# Patient Record
Sex: FEMALE | Race: WHITE | NOT HISPANIC OR LATINO | Employment: UNEMPLOYED | URBAN - METROPOLITAN AREA
[De-identification: names, ages, dates, MRNs, and addresses within clinical notes are randomized per-mention and may not be internally consistent; named-entity substitution may affect disease eponyms.]

---

## 2017-09-25 ENCOUNTER — OFFICE VISIT (OUTPATIENT)
Dept: MEDICAL GROUP | Facility: MEDICAL CENTER | Age: 52
End: 2017-09-25
Payer: MEDICARE

## 2017-09-25 VITALS
TEMPERATURE: 97.5 F | DIASTOLIC BLOOD PRESSURE: 70 MMHG | SYSTOLIC BLOOD PRESSURE: 98 MMHG | BODY MASS INDEX: 35.51 KG/M2 | OXYGEN SATURATION: 95 % | HEIGHT: 64 IN | WEIGHT: 208 LBS | HEART RATE: 85 BPM

## 2017-09-25 DIAGNOSIS — Z12.31 ENCOUNTER FOR SCREENING MAMMOGRAM FOR MALIGNANT NEOPLASM OF BREAST: ICD-10-CM

## 2017-09-25 DIAGNOSIS — Z79.899 HIGH RISK MEDICATION USE: ICD-10-CM

## 2017-09-25 DIAGNOSIS — E66.9 OBESITY (BMI 30-39.9): ICD-10-CM

## 2017-09-25 DIAGNOSIS — E78.5 HYPERLIPIDEMIA LDL GOAL <100: ICD-10-CM

## 2017-09-25 DIAGNOSIS — K59.09 CHRONIC CONSTIPATION: ICD-10-CM

## 2017-09-25 DIAGNOSIS — H02.843 EYELID GLAND SWELLING, RIGHT: ICD-10-CM

## 2017-09-25 DIAGNOSIS — E03.9 ACQUIRED HYPOTHYROIDISM: ICD-10-CM

## 2017-09-25 DIAGNOSIS — E55.9 VITAMIN D DEFICIENCY: ICD-10-CM

## 2017-09-25 DIAGNOSIS — Z12.11 SCREENING FOR MALIGNANT NEOPLASM OF COLON: ICD-10-CM

## 2017-09-25 PROCEDURE — 99214 OFFICE O/P EST MOD 30 MIN: CPT | Performed by: NURSE PRACTITIONER

## 2017-09-25 RX ORDER — POLYETHYLENE GLYCOL 3350 17 G/17G
17 POWDER, FOR SOLUTION ORAL
Qty: 90 EACH | Refills: 1 | Status: SHIPPED | OUTPATIENT
Start: 2017-09-25 | End: 2017-11-28

## 2017-09-25 RX ORDER — SULFAMETHOXAZOLE AND TRIMETHOPRIM 800; 160 MG/1; MG/1
1 TABLET ORAL 2 TIMES DAILY
Qty: 14 TAB | Refills: 0 | Status: SHIPPED | OUTPATIENT
Start: 2017-09-25 | End: 2017-09-25 | Stop reason: SDUPTHER

## 2017-09-25 RX ORDER — SENNA AND DOCUSATE SODIUM 50; 8.6 MG/1; MG/1
1 TABLET, FILM COATED ORAL 2 TIMES DAILY
Qty: 180 TAB | Refills: 1 | Status: SHIPPED | OUTPATIENT
Start: 2017-09-25 | End: 2017-09-25 | Stop reason: SDUPTHER

## 2017-09-25 RX ORDER — SULFAMETHOXAZOLE AND TRIMETHOPRIM 800; 160 MG/1; MG/1
1 TABLET ORAL 2 TIMES DAILY
Qty: 14 TAB | Refills: 0 | Status: SHIPPED | OUTPATIENT
Start: 2017-09-25 | End: 2017-11-28

## 2017-09-25 RX ORDER — POLYETHYLENE GLYCOL 3350 17 G/17G
17 POWDER, FOR SOLUTION ORAL
Qty: 90 EACH | Refills: 1 | Status: SHIPPED | OUTPATIENT
Start: 2017-09-25 | End: 2017-09-25 | Stop reason: SDUPTHER

## 2017-09-25 RX ORDER — ASCORBIC ACID 500 MG
500 TABLET ORAL DAILY
Qty: 30 TAB | Refills: 0
Start: 2017-09-25

## 2017-09-25 RX ORDER — SENNA AND DOCUSATE SODIUM 50; 8.6 MG/1; MG/1
1 TABLET, FILM COATED ORAL 2 TIMES DAILY
Qty: 180 TAB | Refills: 1 | Status: SHIPPED | OUTPATIENT
Start: 2017-09-25

## 2017-09-25 ASSESSMENT — PATIENT HEALTH QUESTIONNAIRE - PHQ9: CLINICAL INTERPRETATION OF PHQ2 SCORE: 0

## 2017-09-25 NOTE — PROGRESS NOTES
Subjective:     Ginette Vasquez is a 52 y.o. female who presents with No chief complaint on file.  .    HPI:   Seen in f/u for rt periorbital eye swelling.  Mother noted the swelling 1 week ago.  Eye is not red.  No pain.  No dg.  She stopped wearing makeup abotu 4 days ago.  No change in swelling.  No vision change.    She is due lab for her HRA next month.  She is due a flu shot.   She is due mammo, colonoscopy, routine lab.    No allergy sx.  No fever, chills or sweating.  + nasal secretion that is clear.  No headache, sinus pressure or ear pressure.   + constipation.  Using 3 colace daily.  Not helping.  She is on paleo diet.  She has lost 10 lbs.        Patient Active Problem List    Diagnosis Date Noted   • Obesity (BMI 30-39.9) 09/25/2017   • Vitamin D deficiency    • Hypothyroidism 10/12/2011   • Bipolar disease, chronic (CMS-[facility]) 10/12/2011   • Preventative health care 10/12/2011       Current medicines (including changes today)  Current Outpatient Prescriptions   Medication Sig Dispense Refill   • ascorbic acid (VITAMIN C) 500 MG tablet Take 1 Tab by mouth every day. 30 Tab 0   • sennosides-docusate sodium (SENOKOT-S) 8.6-50 MG tablet Take 1 Tab by mouth 2 times a day. 180 Tab 1   • psyllium (METAMUCIL SMOOTH TEXTURE) 28 % packet Take 1 Packet by mouth 2 times a day. 180 Each 1   • sulfamethoxazole-trimethoprim (BACTRIM DS) 800-160 MG tablet Take 1 Tab by mouth 2 times a day. 14 Tab 0   • polyethylene glycol/lytes (MIRALAX) Pack Take 1 Packet by mouth every 24 hours as needed. 90 Each 1   • paliperidone (INVEGA) 3 MG ER tablet Take 1 Tab by mouth every morning. 30 Tab 0   • quetiapine (SEROQUEL) 400 MG tablet Take 400 mg by mouth 2 times a day.     • Cholecalciferol (VITAMIN D) 2000 UNITS CAPS Take 2,000 Units by mouth every day.     • lithium (ESKALITH) 300 MG TABS Take 600 mg by mouth every bedtime.     • levothyroxine (LEVOXYL) 100 MCG TABS Take 100 mcg by mouth every day.       No current  "facility-administered medications for this visit.        Allergies   Allergen Reactions   • Nkda [No Known Drug Allergy]        ROS  Constitutional: Negative. Negative for fever, chills, weight loss, malaise/fatigue and diaphoresis.   HENT: Negative. Negative for hearing loss, ear pain, nosebleeds, congestion, sore throat, neck pain, tinnitus and ear discharge.   Respiratory: Negative. Negative for cough, hemoptysis, sputum production, shortness of breath, wheezing and stridor.   Cardiovascular: Negative. Negative for chest pain, palpitations, orthopnea, claudication, leg swelling and PND.   Gastrointestinal: Denies nausea, vomiting, diarrhea, heartburn, melena or hematochezia.  Genitourinary: Denies dysuria, hematuria, urinary incontinence, frequency or urgency.        Objective:     Blood pressure (!) 98/70, pulse 85, temperature 36.4 °C (97.5 °F), height 1.626 m (5' 4\"), weight 94.3 kg (208 lb), SpO2 95 %, not currently breastfeeding. Body mass index is 35.7 kg/m².    Physical Exam:  Vitals reviewed.  Constitutional: Oriented to person, place, and time. appears well-developed and well-nourished. No distress.   Cardiovascular: Normal rate, regular rhythm, normal heart sounds and intact distal pulses. Exam reveals no gallop and no friction rub. No murmur heard. No carotid bruits.   Rt upper eye lid swollen with mild erythema.  No tenderenss.  No dg.  PERRLA.  No nystagmus.  No dg.    Pulmonary/Chest: Effort normal and breath sounds normal. No stridor. No respiratory distress. no wheezes or rales. exhibits no tenderness.   Musculoskeletal: Normal range of motion. exhibits no edema. val pedal pulses 2+.  Lymphadenopathy: No cervical or supraclavicular adenopathy.   Neurological: Alert and oriented to person, place, and time. exhibits normal muscle tone.  Skin: Skin is warm and dry. No diaphoresis.   Psychiatric: Normal mood and affect. Behavior is normal.      Assessment and Plan:     The following treatment plan was " discussed:    1. Eyelid gland swelling, right  REFERRAL TO OPHTHALMOLOGY    sulfamethoxazole-trimethoprim (BACTRIM DS) 800-160 MG tablet    DISCONTINUED: sulfamethoxazole-trimethoprim (BACTRIM DS) 800-160 MG tablet    poss infection.  tx with bactrim and refer ophth.     2. Obesity (BMI 30-39.9)  Patient identified as having weight management issue.  Appropriate orders and counseling given.   3. Acquired hypothyroidism  TSH    FREE THYROXINE   4. High risk medication use  CBC WITH DIFFERENTIAL   5. Vitamin D deficiency  ascorbic acid (VITAMIN C) 500 MG tablet    VITAMIN D,25 HYDROXY   6. Hyperlipidemia LDL goal <100  COMP METABOLIC PANEL    LIPID PROFILE   7. Encounter for screening mammogram for malignant neoplasm of breast  MA-SCREEN MAMMO W/CAD-BILAT   8. Screening for malignant neoplasm of colon  REFERRAL TO GASTROENTEROLOGY   9. Chronic constipation  sennosides-docusate sodium (SENOKOT-S) 8.6-50 MG tablet    psyllium (METAMUCIL SMOOTH TEXTURE) 28 % packet    polyethylene glycol/lytes (MIRALAX) Pack    DISCONTINUED: psyllium (METAMUCIL SMOOTH TEXTURE) 28 % packet    DISCONTINUED: sennosides-docusate sodium (SENOKOT-S) 8.6-50 MG tablet    DISCONTINUED: polyethylene glycol/lytes (MIRALAX) Pack         Followup: Return in about 1 week (around 10/2/2017) for Long, Short.

## 2017-11-15 ENCOUNTER — TELEPHONE (OUTPATIENT)
Dept: MEDICAL GROUP | Facility: MEDICAL CENTER | Age: 52
End: 2017-11-15

## 2017-11-16 NOTE — TELEPHONE ENCOUNTER
ANNUAL WELLNESS VISIT PRE-VISIT PLANNING     1.  Reviewed note from last office visit with PCP: YES    2.  If any orders were placed at last visit, do we have Results/Consult Notes?        •  Labs - Labs ordered, NOT completed. Patient advised to complete prior to next appointment.   Note: If patient appointment is for lab review and patient did not complete labs, check with provider if OK to reschedule patient until labs completed.       •  Imaging - Mammogram- appt will be rescheduled        •  Referrals - colonoscopy    3.  Immunizations were updated in [facility] using WebIZ?: Yes       •  WebIZ Recommendations: TDAP, ZOSTAVAX (Shingles) and MMR         •  Is patient due for Tdap? YES. Patient was notified of copay/out of pocket cost.       •  Is patient due for Shingles? NO     4.  Patient is due for the following Health Maintenance Topics:   Health Maintenance Due   Topic Date Due   • IMM DTaP/Tdap/Td Vaccine (1 - Tdap) 06/29/1985   • IMM PNEUMOCOCCAL 19-64 (ADULT) MEDIUM RISK SERIES (1 of 1 - PPSV23) 06/29/1985   • Annual Wellness Visit  07/23/2015   • PAP SMEAR  01/29/2016   • COLONOSCOPY  06/29/2016 pt will schedule in 1/2018   • MAMMOGRAM  05/17/2017           5.  Reviewed/Updated the following with patient:       •   Preferred Pharmacy? YES       •   Preferred Lab? YES       •   Preferred Communication? YES       •   Allergies? YES       •   Medications? YES. Was Abstract Encounter opened and chart updated? YES       •   Social History? YES. Was Abstract Encounter opened and chart updated? YES       •   Family History (document living status of immediate family members and if + hx of cancer, diabetes, hypertension, hyperlipidemia, heart attack, stroke) YES. Was Abstract Encounter opened and chart updated? YES    6.  Care Team Updated:       •   DME Company (gait device, O2, CPAP, etc.): YES       •   Other Specialists (eye doctor, derm, GYN, cardiology, endo, etc): YES    7.  Patient has the following Care Path  diagnoses on Problem List:  NONE    8.  Specialty Comments was updated with diagnosis information provided by SCP: YES    9.  Patient was advised: “This is a free wellness visit. The provider will screen for medical conditions to help you stay healthy. If you have other concerns to address you may be asked to discuss these at a separate visit or there may be an additional fee.”     6.  Patient was informed to arrive 15 min prior to their scheduled appointment and bring in their medication bottles.

## 2017-11-28 ENCOUNTER — OFFICE VISIT (OUTPATIENT)
Dept: MEDICAL GROUP | Facility: MEDICAL CENTER | Age: 52
End: 2017-11-28
Payer: MEDICARE

## 2017-11-28 VITALS
DIASTOLIC BLOOD PRESSURE: 82 MMHG | BODY MASS INDEX: 35.17 KG/M2 | SYSTOLIC BLOOD PRESSURE: 122 MMHG | OXYGEN SATURATION: 95 % | TEMPERATURE: 97.1 F | HEART RATE: 82 BPM | WEIGHT: 206 LBS | RESPIRATION RATE: 14 BRPM | HEIGHT: 64 IN

## 2017-11-28 DIAGNOSIS — Z12.31 ENCOUNTER FOR SCREENING MAMMOGRAM FOR MALIGNANT NEOPLASM OF BREAST: ICD-10-CM

## 2017-11-28 DIAGNOSIS — E66.9 OBESITY (BMI 30-39.9): ICD-10-CM

## 2017-11-28 DIAGNOSIS — F31.9 BIPOLAR DISEASE, CHRONIC (HCC): Primary | ICD-10-CM

## 2017-11-28 DIAGNOSIS — E55.9 VITAMIN D DEFICIENCY: ICD-10-CM

## 2017-11-28 DIAGNOSIS — Z12.11 SCREEN FOR COLON CANCER: ICD-10-CM

## 2017-11-28 DIAGNOSIS — E03.9 ACQUIRED HYPOTHYROIDISM: ICD-10-CM

## 2017-11-28 DIAGNOSIS — Z23 NEED FOR PNEUMOCOCCAL VACCINATION: ICD-10-CM

## 2017-11-28 PROCEDURE — G0439 PPPS, SUBSEQ VISIT: HCPCS | Mod: 25 | Performed by: NURSE PRACTITIONER

## 2017-11-28 PROCEDURE — 90670 PCV13 VACCINE IM: CPT | Performed by: NURSE PRACTITIONER

## 2017-11-28 PROCEDURE — G0009 ADMIN PNEUMOCOCCAL VACCINE: HCPCS | Performed by: NURSE PRACTITIONER

## 2017-11-28 ASSESSMENT — PATIENT HEALTH QUESTIONNAIRE - PHQ9: CLINICAL INTERPRETATION OF PHQ2 SCORE: 0

## 2017-11-28 NOTE — PROGRESS NOTES
Chief Complaint   Patient presents with   • Annual Wellness Visit         HPI:  Ginette is a 52 y.o. here for Medicare Annual Wellness Visit    She is feeling well.    She is overdue for pap smear,  Mammo, colonoscopy, prevnar 13 and lab.  Will do and f/u for review.        Patient Active Problem List    Diagnosis Date Noted   • Obesity (BMI 30-39.9) 09/25/2017   • Vitamin D deficiency    • Hypothyroidism 10/12/2011   • Bipolar disease, chronic (CMS-HCC) 10/12/2011   • Preventative health care 10/12/2011       Current Outpatient Prescriptions   Medication Sig Dispense Refill   • ascorbic acid (VITAMIN C) 500 MG tablet Take 1 Tab by mouth every day. 30 Tab 0   • paliperidone (INVEGA) 3 MG ER tablet Take 1 Tab by mouth every morning. 30 Tab 0   • quetiapine (SEROQUEL) 400 MG tablet Take 400 mg by mouth 2 times a day.     • Cholecalciferol (VITAMIN D) 2000 UNITS CAPS Take 2,000 Units by mouth every day.     • lithium (ESKALITH) 300 MG TABS Take 600 mg by mouth every bedtime.     • levothyroxine (LEVOXYL) 100 MCG TABS Take 100 mcg by mouth every day.     • sennosides-docusate sodium (SENOKOT-S) 8.6-50 MG tablet Take 1 Tab by mouth 2 times a day. (Patient not taking: Reported on 11/28/2017) 180 Tab 1     No current facility-administered medications for this visit.         Patient is taking medications as noted in medication list.  Current supplements as per medication list.     Allergies: Nkda [no known drug allergy]    Current social contact/activities: going shopping with sister and mom     Is patient current with immunizations? No, due for PNEUMOVAX (PPSV23). Patient is interested in receiving PNEUMOVAX (PPSV23) today.    She  reports that she has been smoking Cigarettes.  She has a 10.00 pack-year smoking history. She has never used smokeless tobacco. She reports that she does not drink alcohol or use drugs.  Ready to quit: No  Counseling given: Yes        DPA/Advanced directive: Patient does not have an Advanced  Directive.  A packet and workshop information was given on Advanced Directives.    ROS:    Gait: Uses no assistive device   Ostomy: no   Other tubes: no   Amputations: no   Chronic oxygen use no   Last eye exam 10/2017   Wears hearing aids: no   : Denies any urinary leakage during the last 6 months  Review of Systems   Constitutional: Negative.  Negative for fever, chills, weight loss, malaise/fatigue and diaphoresis.   HENT: Negative.  Negative for hearing loss, ear pain, nosebleeds, congestion, sore throat, neck pain, tinnitus and ear discharge.    Eyes: Negative.  Negative for blurred vision, double vision, photophobia, pain, discharge and redness.   Respiratory: Negative.  Negative for cough, hemoptysis, sputum production, shortness of breath, wheezing and stridor.    Cardiovascular: Negative.  Negative for chest pain, palpitations, orthopnea, claudication, leg swelling and PND.   Gastrointestinal: Negative.  Negative for heartburn, nausea, vomiting, abdominal pain, diarrhea, constipation, blood in stool and melena.   Genitourinary: Negative.  Negative for dysuria, urgency, frequency, incontinence, hematuria and flank pain.   Musculoskeletal: Negative.  Negative for myalgias, back pain, joint pain and falls.   Skin: Negative.  Negative for itching and rash.   Neurological: Negative.  Negative for dizziness, tingling, tremors, sensory change, speech change, focal weakness, seizures, loss of consciousness, weakness and headaches.   Endo/Heme/Allergies: Negative.  Negative for environmental allergies and polydipsia. Does not bruise/bleed easily.   Psychiatric/Behavioral: Negative.  Negative for suicidal ideas, hallucinations, memory loss and substance abuse. The patient is not does not have insomnia.    All other systems reviewed and are negative.            Depression Screening    Little interest or pleasure in doing things?  0 - not at all  Feeling down, depressed, or hopeless? 0 - not at all  Patient Health  Questionnaire Score: 0    If depressive symptoms identified deferred to follow up visit unless specifically addressed in assessment and plan.    Interpretation of PHQ-9 Total Score   Score Severity   1-4 No Depression   5-9 Mild Depression   10-14 Moderate Depression   15-19 Moderately Severe Depression   20-27 Severe Depression    Screening for Cognitive Impairment    Three Minute Recall (apple, watch, malena)  3/3 Apple, malena, table  Draw clock face with all 12 numbers set to the hand to show 10 minutes past 11 o'clock  1 1/5  If cognitive concerns identified, deferred for follow up unless specifically addressed in assessment and plan.    Fall Risk Assessment    Has the patient had two or more falls in the last year or any fall with injury in the last year?  No  If fall risk identified, deferred for follow up unless specifically addressed in assessment and plan.    Safety Assessment    Throw rugs on floor.  Yes  Handrails on all stairs.  Yes  Good lighting in all hallways.  Yes  Difficulty hearing.  No  Patient counseled about all safety risks that were identified.    Functional Assessment ADLs    Are there any barriers preventing you from cooking for yourself or meeting nutritional needs?  No.    Are there any barriers preventing you from driving safely or obtaining transportation?  No.    Are there any barriers preventing you from using a telephone or calling for help?  No.    Are there any barriers preventing you from shopping?  No.    Are there any barriers preventing you from taking care of your own finances?  No.    Are there any barriers preventing you from managing your medications?  No.    Are you currently engaging any exercise or physical activity?  Yes.  Walking 1/2 -1 hour every day.    Health Maintenance Summary                IMM DTaP/Tdap/Td Vaccine Overdue 6/29/1985     IMM PNEUMOCOCCAL 19-64 (ADULT) MEDIUM RISK SERIES Overdue 6/29/1985     Annual Wellness Visit Overdue 7/23/2015      Done  "7/22/2014     PAP SMEAR Overdue 1/29/2016      Done 1/29/2013 PAP IG, CT-NG TV RFX HPV ASCU (A)    COLONOSCOPY Overdue 6/29/2016     MAMMOGRAM Overdue 5/17/2017      Done 5/17/2016 MA-SCREEN MAMMO W/CAD-BILAT     Patient has more history with this topic...          Patient Care Team:  DENNY Wills as PCP - General  Giovani Jones M.D. as Consulting Physician (Psychiatry)  Michelle Nuñez M.D. as Consulting Physician (Ophthalmology)    Social History   Substance Use Topics   • Smoking status: Current Every Day Smoker     Packs/day: 0.50     Years: 20.00     Types: Cigarettes   • Smokeless tobacco: Never Used   • Alcohol use No     Family History   Problem Relation Age of Onset   • Cancer Maternal Grandfather    • Heart Disease Maternal Grandfather    • Cancer Paternal Grandmother    • Cancer Paternal Grandfather      She  has a past medical history of Bipolar 1 disorder, depressed (CMS-HCC); Hypothyroidism; Obesity (BMI 30-39.9); and Vitamin D deficiency. She also has no past medical history of Encounter for long-term (current) use of other medications.   Past Surgical History:   Procedure Laterality Date   • LUMPECTOMY     • TUBAL COAGULATION LAPAROSCOPIC BILATERAL             Exam:     Blood pressure 122/82, pulse 82, temperature 36.2 °C (97.1 °F), resp. rate 14, height 1.613 m (5' 3.5\"), weight 93.4 kg (206 lb), last menstrual period 06/28/2017, SpO2 95 %, not currently breastfeeding. Body mass index is 35.92 kg/m².    Hearing excellent.    Dentition upper and lower dentures  Alert, oriented in no acute distress.  Eye contact is good, speech goal directed, affect calm  Physical Exam   Vitals reviewed.  Constitutional: oriented to person, place, and time. appears well-developed and well-nourished. No distress.   HENT: Head: Normocephalic and atraumatic. Bilateral tympanic membranes wnl w/o bulging.  Right Ear: External ear normal. Left Ear: External ear normal. Nose: Nose normal.  Mouth/Throat: Oropharynx " is clear and moist. No oropharyngeal exudate. val tm wnl. Eyes: Conjunctivae and EOM are normal. Pupils are equal, round, and reactive to light. Right eye exhibits no discharge. Left eye exhibits no discharge. No scleral icterus.   mild rt upper lip drooping.   Neck: Normal range of motion. Neck supple. No JVD present.   Cardiovascular: Normal rate, regular rhythm, normal heart sounds and intact distal pulses.  Exam reveals no gallop and no friction rub.  No murmur heard.  No carotid bruits   Pulmonary/Chest: Effort normal and breath sounds normal. No stridor. No respiratory distress. no wheezes or rales. exhibits no tenderness.   Abdominal: Soft. Bowel sounds are normal. exhibits no distension and no mass. No tenderness. no rebound and no guarding.   Musculoskeletal: Normal range of motion. exhibits no edema or tenderness.  val pedal pulses 2+.  Lymphadenopathy:  no cervical or supraclavicular adenopathy.   Neurological: alert and oriented to person, place, and time. has normal reflexes. displays normal reflexes. No cranial nerve deficit. exhibits normal muscle tone. Coordination normal.   Skin: Skin is warm and dry. No rash noted. no diaphoresis. No erythema. No pallor.   Psychiatric: normal mood and affect. behavior is normal.         Assessment and Plan. The following treatment and monitoring plan is recommended:    1. Bipolar disease, chronic (CMS-HCC)      stable on meds.  followed by psych   2. Acquired hypothyroidism      overdue for lab.  will do lab and f/u for review and pap smear.    3. Obesity (BMI 30-39.9)      continue and restart paleo diet and exercise.  she has lost wt.     4. Vitamin D deficiency      recheck lab and f/u for review.  stable on otc supplement.    5. Encounter for screening mammogram for malignant neoplasm of breast  MA-SCREEN MAMMO W/CAD-BILAT   6. Screen for colon cancer  REFERRAL TO GASTROENTEROLOGY   7. Need for pneumococcal vaccination  PNEUMOCOCCAL CONJUGATE VACCINE 13-VALENT            Services suggested: No services needed at this time.  Lives with mom.  She is primary caregiver  Health Care Screening recommendations as per orders if indicated.  Referrals offered: PT/OT/Nutrition counseling/Behavioral Health/Smoking cessation as per orders if indicated.    Discussion today about general wellness and lifestyle habits:    · Prevent falls and reduce trip hazards; Cautioned about securing or removing rugs.  · Have a working fire alarm and carbon monoxide detector;   · Engage in regular physical activity and social activities       Follow-up: 1 month for lab review and pap smear

## 2017-11-28 NOTE — ASSESSMENT & PLAN NOTE
She is overdue for lab.  She is on otc supplement.  Last d was sl low at 28.  Will do lab and f/u fore review and pap smear.

## 2017-11-28 NOTE — ASSESSMENT & PLAN NOTE
She is overdue for her thyroid lab.  She is also due for pap smear.  Will do lab and f/u for pap smear.

## 2017-11-28 NOTE — LETTER
November 28, 2017        Patient: Ginette Vasquez   YOB: 1965   Date of Visit: 11/28/2017           To Whom It May Concern:    It is my medical opinion that Ginette Vasquez should remain out of participation with work and school until Thursday, November 30, 2017.    If you have any questions or concerns, please don't hesitate to call.        Sincerely,          DANETTE Wills.  Electronically Signed    [place] [address] [address] [id number] [phone] (Phone)  [phone] (Fax)

## 2017-11-28 NOTE — ASSESSMENT & PLAN NOTE
She had lost 15 lbs with healthy diet and exercise.  Then she went on vacation.  She was careful with what she ate.  Didn't gain much back.   She is going to restart paleo diet today.  Walking  Dogs for exercise.

## 2017-11-28 NOTE — ASSESSMENT & PLAN NOTE
She was recently vacationed iwth her son in [place].  Since coming back she is having anxiety about missing him.  She is better today.  She will also have panic attacks when she is riding in a car.  Overall she is stable on meds.  Followed by dr jules.

## 2017-11-30 ENCOUNTER — HOSPITAL ENCOUNTER (OUTPATIENT)
Dept: LAB | Facility: MEDICAL CENTER | Age: 52
End: 2017-11-30
Attending: NURSE PRACTITIONER
Payer: MEDICARE

## 2017-11-30 DIAGNOSIS — Z79.899 HIGH RISK MEDICATION USE: ICD-10-CM

## 2017-11-30 DIAGNOSIS — E78.5 HYPERLIPIDEMIA LDL GOAL <100: ICD-10-CM

## 2017-11-30 DIAGNOSIS — E55.9 VITAMIN D DEFICIENCY: ICD-10-CM

## 2017-11-30 DIAGNOSIS — E03.9 ACQUIRED HYPOTHYROIDISM: ICD-10-CM

## 2017-11-30 LAB
25(OH)D3 SERPL-MCNC: 36 NG/ML (ref 30–100)
ALBUMIN SERPL BCP-MCNC: 4.1 G/DL (ref 3.2–4.9)
ALBUMIN/GLOB SERPL: 1.4 G/DL
ALP SERPL-CCNC: 88 U/L (ref 30–99)
ALT SERPL-CCNC: 14 U/L (ref 2–50)
ANION GAP SERPL CALC-SCNC: 7 MMOL/L (ref 0–11.9)
AST SERPL-CCNC: 12 U/L (ref 12–45)
BASOPHILS # BLD AUTO: 0.6 % (ref 0–1.8)
BASOPHILS # BLD: 0.04 K/UL (ref 0–0.12)
BILIRUB SERPL-MCNC: 0.3 MG/DL (ref 0.1–1.5)
BUN SERPL-MCNC: 14 MG/DL (ref 8–22)
CALCIUM SERPL-MCNC: 10.1 MG/DL (ref 8.5–10.5)
CHLORIDE SERPL-SCNC: 108 MMOL/L (ref 96–112)
CHOLEST SERPL-MCNC: 210 MG/DL (ref 100–199)
CO2 SERPL-SCNC: 23 MMOL/L (ref 20–33)
CREAT SERPL-MCNC: 0.68 MG/DL (ref 0.5–1.4)
EOSINOPHIL # BLD AUTO: 0.18 K/UL (ref 0–0.51)
EOSINOPHIL NFR BLD: 2.5 % (ref 0–6.9)
ERYTHROCYTE [DISTWIDTH] IN BLOOD BY AUTOMATED COUNT: 46.8 FL (ref 35.9–50)
GFR SERPL CREATININE-BSD FRML MDRD: >60 ML/MIN/1.73 M 2
GLOBULIN SER CALC-MCNC: 2.9 G/DL (ref 1.9–3.5)
GLUCOSE SERPL-MCNC: 100 MG/DL (ref 65–99)
HCT VFR BLD AUTO: 39.7 % (ref 37–47)
HDLC SERPL-MCNC: 44 MG/DL
HGB BLD-MCNC: 12.8 G/DL (ref 12–16)
IMM GRANULOCYTES # BLD AUTO: 0.03 K/UL (ref 0–0.11)
IMM GRANULOCYTES NFR BLD AUTO: 0.4 % (ref 0–0.9)
LDLC SERPL CALC-MCNC: 141 MG/DL
LYMPHOCYTES # BLD AUTO: 2.05 K/UL (ref 1–4.8)
LYMPHOCYTES NFR BLD: 28.4 % (ref 22–41)
MCH RBC QN AUTO: 29.1 PG (ref 27–33)
MCHC RBC AUTO-ENTMCNC: 32.2 G/DL (ref 33.6–35)
MCV RBC AUTO: 90.2 FL (ref 81.4–97.8)
MONOCYTES # BLD AUTO: 0.41 K/UL (ref 0–0.85)
MONOCYTES NFR BLD AUTO: 5.7 % (ref 0–13.4)
NEUTROPHILS # BLD AUTO: 4.5 K/UL (ref 2–7.15)
NEUTROPHILS NFR BLD: 62.4 % (ref 44–72)
NRBC # BLD AUTO: 0 K/UL
NRBC BLD AUTO-RTO: 0 /100 WBC
PLATELET # BLD AUTO: 321 K/UL (ref 164–446)
PMV BLD AUTO: 10.2 FL (ref 9–12.9)
POTASSIUM SERPL-SCNC: 4.1 MMOL/L (ref 3.6–5.5)
PROT SERPL-MCNC: 7 G/DL (ref 6–8.2)
RBC # BLD AUTO: 4.4 M/UL (ref 4.2–5.4)
SODIUM SERPL-SCNC: 138 MMOL/L (ref 135–145)
T4 FREE SERPL-MCNC: 0.71 NG/DL (ref 0.53–1.43)
TRIGL SERPL-MCNC: 127 MG/DL (ref 0–149)
TSH SERPL DL<=0.005 MIU/L-ACNC: 1.33 UIU/ML (ref 0.3–3.7)
WBC # BLD AUTO: 7.2 K/UL (ref 4.8–10.8)

## 2017-11-30 PROCEDURE — 82306 VITAMIN D 25 HYDROXY: CPT

## 2017-11-30 PROCEDURE — 84443 ASSAY THYROID STIM HORMONE: CPT

## 2017-11-30 PROCEDURE — 80061 LIPID PANEL: CPT

## 2017-11-30 PROCEDURE — 36415 COLL VENOUS BLD VENIPUNCTURE: CPT

## 2017-11-30 PROCEDURE — 85025 COMPLETE CBC W/AUTO DIFF WBC: CPT

## 2017-11-30 PROCEDURE — 80053 COMPREHEN METABOLIC PANEL: CPT

## 2017-11-30 PROCEDURE — 84439 ASSAY OF FREE THYROXINE: CPT

## 2018-07-10 ENCOUNTER — HOSPITAL ENCOUNTER (OUTPATIENT)
Dept: LAB | Facility: MEDICAL CENTER | Age: 53
End: 2018-07-10
Attending: PSYCHIATRY & NEUROLOGY
Payer: MEDICARE

## 2018-07-10 LAB
LITHIUM SERPL-MCNC: 0.5 MMOL/L (ref 0.6–1.2)
TSH SERPL DL<=0.005 MIU/L-ACNC: 0.44 UIU/ML (ref 0.38–5.33)

## 2018-07-10 PROCEDURE — 84443 ASSAY THYROID STIM HORMONE: CPT

## 2018-07-10 PROCEDURE — 80178 ASSAY OF LITHIUM: CPT

## 2018-07-10 PROCEDURE — 36415 COLL VENOUS BLD VENIPUNCTURE: CPT

## 2018-11-27 ENCOUNTER — TELEPHONE (OUTPATIENT)
Dept: MEDICAL GROUP | Facility: MEDICAL CENTER | Age: 53
End: 2018-11-27

## 2018-11-27 RX ORDER — LITHIUM CARBONATE 450 MG
450 TABLET, EXTENDED RELEASE ORAL 2 TIMES DAILY
COMMUNITY

## 2018-11-27 NOTE — TELEPHONE ENCOUNTER
Future Appointments       Provider Department Center    12/4/2018 1:40 PM DENNY Wills; [facility]          ANNUAL WELLNESS VISIT PRE-VISIT PLANNING WITHOUT OUTREACH    1.  Reviewed note from last office visit with PCP: YES    2.  If any orders were placed at last visit, do we have Results/Consult Notes?        •  Labs - Labs ordered, completed on 11/30/17 and results are in chart.  Note: If patient appointment is for lab review and patient did not complete labs, check with provider if OK to reschedule patient until labs completed.       •  Imaging - Imaging ordered, NOT completed. Patient advised to complete prior to next appointment.       •  Referrals - Referral ordered, patient has NOT been seen.0    3.  Immunizations were updated in [facility] using [employer]?: Yes       •  WebIZ Recommendations: PNEUMOVAX (PPSV23), TDAP and SHINGRIX (Shingles)        •  Is patient due for Tdap? YES. Patient was not notified of copay/out of pocket cost.       •  Is patient due for Shingles? YES. Patient was not notified of copay/out of pocket cost.     4.  Patient is due for the following Health Maintenance Topics:   Health Maintenance Due   Topic Date Due   • IMM DTaP/Tdap/Td Vaccine (1 - Tdap) 06/29/1985   • PAP SMEAR  01/29/2016   • COLONOSCOPY  06/29/2016   • IMM ZOSTER VACCINES (1 of 2) 06/29/2016   • MAMMOGRAM  05/17/2017   • IMM PNEUMOCOCCAL 19-64 (ADULT) MEDIUM RISK SERIES (1 of 1 - PPSV23) 01/23/2018       - Patient plans to schedule appointment for Mammogram.    5.  Reviewed/Updated the following with patient:       •   Preferred Pharmacy? YES       •   Preferred Lab? YES       •   Preferred Communication? YES       •   Allergies? YES       •   Medications? YES. Was Abstract Encounter opened and chart updated? YES       •   Social History? YES. Was Abstract Encounter opened and chart updated? YES       •   Family History (document living status of immediate  family members and if + hx of  cancer, diabetes, hypertension, hyperlipidemia, heart attack, stroke) YES. Was Abstract Encounter opened and chart updated? YES    6.  Care Team Updated:       •   DME Company (gait device, O2, CPAP, etc.): NO       •   Other Specialists (eye doctor, derm, GYN, cardiology, endo, etc): YES    7.  Patient has the following Care Path diagnoses on Problem List:  NONE    8.  Patient was advised: “This is a free wellness visit. The provider will screen for medical conditions to help you stay healthy. If you have other concerns to address you may be asked to discuss these at a separate visit or there may be an additional fee.”     9.  Patient was informed to arrive 15 min prior to their scheduled appointment and bring in their medication bottles.

## 2018-11-27 NOTE — TELEPHONE ENCOUNTER
Left message for patient to call back regarding pre-visit planning. Please transfer call to [phone].

## 2019-01-17 ENCOUNTER — TELEPHONE (OUTPATIENT)
Dept: MEDICAL GROUP | Facility: MEDICAL CENTER | Age: 54
End: 2019-01-17

## 2019-01-17 NOTE — TELEPHONE ENCOUNTER
Future Appointments       Provider Department Center    1/24/2019 11:20 AM DENNY Wills; [facility]          ANNUAL WELLNESS VISIT PRE-VISIT PLANNING WITH OUTREACH    1.  If any orders were placed at last visit, do we have Results/Consult Notes?        •  Labs - Labs ordered, completed on 11/30/17 and results are in chart.  Note: If patient appointment is for lab review and patient did not complete labs, check with provider if OK to reschedule patient until labs completed.       •  Imaging - Imaging was not ordered at last office visit.       •  Referrals - Referral ordered, patient has NOT been seen.    2.  Immunizations were updated in Epic using WebIZ?:Epic matches WebIZ       •  WebIZ Recommendations: TDAP, SHINGRIX (Shingles) and MMR       •  Is patient due for Tdap? YES. Patient was notified of copay/out of pocket cost.       •  Is patient due for Shingrx? YES. Patient was notified of copay/out of pocket cost.    3.  Patient has the following Care Path diagnoses on Problem List:  NONE    4.  Orders for overdue Health Maintenance topics pended in Pre-Charting? YES

## 2019-01-24 ENCOUNTER — OFFICE VISIT (OUTPATIENT)
Dept: MEDICAL GROUP | Facility: MEDICAL CENTER | Age: 54
End: 2019-01-24
Payer: MEDICARE

## 2019-01-24 VITALS
HEART RATE: 82 BPM | TEMPERATURE: 98 F | DIASTOLIC BLOOD PRESSURE: 66 MMHG | HEIGHT: 64 IN | OXYGEN SATURATION: 95 % | WEIGHT: 220 LBS | BODY MASS INDEX: 37.56 KG/M2 | SYSTOLIC BLOOD PRESSURE: 114 MMHG

## 2019-01-24 DIAGNOSIS — Z12.31 ENCOUNTER FOR SCREENING MAMMOGRAM FOR BREAST CANCER: ICD-10-CM

## 2019-01-24 DIAGNOSIS — Z12.31 ENCOUNTER FOR SCREENING MAMMOGRAM FOR MALIGNANT NEOPLASM OF BREAST: ICD-10-CM

## 2019-01-24 DIAGNOSIS — Z23 NEED FOR VACCINATION: ICD-10-CM

## 2019-01-24 DIAGNOSIS — H02.401 ACQUIRED PTOSIS OF RIGHT EYELID: ICD-10-CM

## 2019-01-24 DIAGNOSIS — E66.9 OBESITY (BMI 35.0-39.9 WITHOUT COMORBIDITY): ICD-10-CM

## 2019-01-24 DIAGNOSIS — Z79.899 HIGH RISK MEDICATION USE: ICD-10-CM

## 2019-01-24 DIAGNOSIS — R29.810 FACIAL DROOP: ICD-10-CM

## 2019-01-24 DIAGNOSIS — E03.9 ACQUIRED HYPOTHYROIDISM: ICD-10-CM

## 2019-01-24 DIAGNOSIS — F31.9 BIPOLAR DISEASE, CHRONIC (HCC): ICD-10-CM

## 2019-01-24 DIAGNOSIS — Z12.11 SCREENING FOR COLON CANCER: ICD-10-CM

## 2019-01-24 DIAGNOSIS — E55.9 VITAMIN D DEFICIENCY: ICD-10-CM

## 2019-01-24 DIAGNOSIS — R29.818 OTHER SYMPTOMS AND SIGNS INVOLVING THE NERVOUS SYSTEM: ICD-10-CM

## 2019-01-24 PROCEDURE — G0439 PPPS, SUBSEQ VISIT: HCPCS | Performed by: NURSE PRACTITIONER

## 2019-01-24 PROCEDURE — 90732 PPSV23 VACC 2 YRS+ SUBQ/IM: CPT | Performed by: NURSE PRACTITIONER

## 2019-01-24 PROCEDURE — G0009 ADMIN PNEUMOCOCCAL VACCINE: HCPCS | Performed by: NURSE PRACTITIONER

## 2019-01-24 PROCEDURE — 99213 OFFICE O/P EST LOW 20 MIN: CPT | Mod: 25 | Performed by: NURSE PRACTITIONER

## 2019-01-24 RX ORDER — LEVOTHYROXINE SODIUM 0.1 MG/1
100 TABLET ORAL DAILY
Qty: 30 TAB | Refills: 0 | Status: SHIPPED | OUTPATIENT
Start: 2019-01-24

## 2019-01-24 RX ORDER — PALIPERIDONE 6 MG/1
6 TABLET, EXTENDED RELEASE ORAL EVERY MORNING
Qty: 30 TAB | Refills: 0
Start: 2019-01-24

## 2019-01-24 ASSESSMENT — ACTIVITIES OF DAILY LIVING (ADL): BATHING_REQUIRES_ASSISTANCE: 0

## 2019-01-24 ASSESSMENT — PATIENT HEALTH QUESTIONNAIRE - PHQ9
SUM OF ALL RESPONSES TO PHQ QUESTIONS 1-9: 2
5. POOR APPETITE OR OVEREATING: 0 - NOT AT ALL
CLINICAL INTERPRETATION OF PHQ2 SCORE: 2

## 2019-01-24 ASSESSMENT — ENCOUNTER SYMPTOMS: GENERAL WELL-BEING: POOR

## 2019-01-24 NOTE — ASSESSMENT & PLAN NOTE
She eats a lots of sweets.  Otherwise she eats a healthy diet.  She is walking abotu 3 miles daily.

## 2019-01-24 NOTE — ASSESSMENT & PLAN NOTE
Her last vitamin d lab over a year ago was ok.  She is now on otc supplement 5000 units daily.  Will recheck lab.

## 2019-01-24 NOTE — PROGRESS NOTES
Chief Complaint   Patient presents with   • Annual Wellness Visit         HPI:  Ginette is a 53 y.o. here for Medicare Annual Wellness Visit    Seen in f/u for HRA.  SHE IS FEELING well.  About 6 months ago she and mother noted rt eyelid drooping.  She has seen derm and ophth and they didn't find any reason.  No pain.  No discomfort.  \  Vitamin D deficiency  Her last vitamin d lab over a year ago was ok.  She is now on otc supplement 5000 units daily.  Will recheck lab.      Obesity (BMI 35.0-39.9 without comorbidity) ([facility])  She eats a lots of sweets.  Otherwise she eats a healthy diet.  She is walking abotu 3 miles daily.      Hypothyroidism  She is stable on meds.  She is due lab for updated TSH, T4,  DO LAB and f/u 1 month.     Bipolar disease, chronic  Followed by dr jules.  Stable on meds.          Patient Active Problem List    Diagnosis Date Noted   • Obesity (BMI 30-39.9) 09/25/2017   • Vitamin D deficiency    • Hypothyroidism 10/12/2011   • Bipolar disease, chronic (HCC) 10/12/2011   • Preventative health care 10/12/2011       Current Outpatient Prescriptions   Medication Sig Dispense Refill   • lithium CR (ESKALITH CR) 450 MG Tab CR Take 450 mg by mouth 2 times a day.     • ascorbic acid (VITAMIN C) 500 MG tablet Take 1 Tab by mouth every day. 30 Tab 0   • sennosides-docusate sodium (SENOKOT-S) 8.6-50 MG tablet Take 1 Tab by mouth 2 times a day. 180 Tab 1   • paliperidone (INVEGA) 3 MG ER tablet Take 1 Tab by mouth every morning. (Patient taking differently: Take 6 mg by mouth every morning.) 30 Tab 0   • quetiapine (SEROQUEL) 400 MG tablet Take 400 mg by mouth 2 times a day.     • Cholecalciferol (VITAMIN D) 2000 UNITS CAPS Take 2,000 Units by mouth every day.     • lithium (ESKALITH) 300 MG TABS Take 300 mg by mouth Every morning on an empty stomach.     • levothyroxine (LEVOXYL) 100 MCG TABS Take 100 mcg by mouth every day.       No current facility-administered medications for this visit.          Patient is taking medications as noted in medication list.  Current supplements as per medication list.     Allergies: Nkda [no known drug allergy]    Current social contact/activities: Pt reports going shopping with her sister, visiting her son in [place], going out to eat.     Is patient current with immunizations? No, due for PNEUMOVAX (PPSV23), TDAP and SHINGRIX (Shingles). Patient is interested in receiving PNEUMOVAX (PPSV23) today.    She  reports that she has been smoking Cigarettes.  She has a 10.00 pack-year smoking history. She has never used smokeless tobacco. She reports that she does not drink alcohol or use drugs.  Ready to quit: No  Counseling given: Not Answered        DPA/Advanced directive: Patient does not have an Advanced Directive.  A packet and workshop information was given on Advanced Directives.    ROS:    Gait: Uses no assistive device   Ostomy: No   Other tubes: No   Amputations: No   Chronic oxygen use No   Last eye exam June 2018   Wears hearing aids: No   : Denies any urinary leakage during the last 6 months  Review of Systems   Constitutional: Negative.  Negative for fever, chills, weight loss, malaise/fatigue and diaphoresis.   HENT: Negative.  Negative for hearing loss, ear pain, nosebleeds, congestion, sore throat, neck pain, tinnitus and ear discharge.    Eyes: Negative.  Negative for blurred vision, double vision, photophobia, pain, discharge and redness.   Respiratory: Negative.  Negative for cough, hemoptysis, sputum production, shortness of breath, wheezing and stridor.    Cardiovascular: Negative.  Negative for chest pain, palpitations, orthopnea, claudication, leg swelling and PND.   Gastrointestinal: Negative.  Negative for heartburn, nausea, vomiting, abdominal pain, diarrhea, constipation, blood in stool and melena.   Genitourinary: Negative.  Negative for dysuria, urgency, frequency, incontinence, hematuria and flank pain.   Musculoskeletal: Negative.  Negative for  myalgias, back pain, joint pain and falls.   Skin: Negative.  Negative for itching and rash.   Neurological: Negative.  Negative for dizziness, tingling, tremors, sensory change, speech change, focal weakness, seizures, loss of consciousness, weakness and headaches.   Endo/Heme/Allergies: Negative.  Negative for environmental allergies and polydipsia. Does not bruise/bleed easily.   Psychiatric/Behavioral: Negative.  Negative for depression, suicidal ideas, hallucinations, memory loss and substance abuse. The patient is not nervous/anxious and does not have insomnia.    All other systems reviewed and are negative.      Depression Screening    Little interest or pleasure in doing things?  0 - not at all  Feeling down, depressed, or hopeless? 2 - more than half the days  Trouble falling or staying asleep, or sleeping too much?  0 - not at all  Feeling tired or having little energy?  0 - not at all  Poor appetite or overeating?  0 - not at all  Feeling bad about yourself - or that you are a failure or have let yourself or your family down? 0 - not at all  Trouble concentrating on things, such as reading the newspaper or watching television? 0 - not at all  Moving or speaking so slowly that other people could have noticed.  Or the opposite - being so fidgety or restless that you have been moving around a lot more than usual?  0 - not at all  Thoughts that you would be better off dead, or of hurting yourself?  0 - not at all  Patient Health Questionnaire Score: 2      If depressive symptoms identified deferred to follow up visit unless specifically addressed in assessment and plan.    Interpretation of PHQ-9 Total Score   Score Severity   1-4 No Depression   5-9 Mild Depression   10-14 Moderate Depression   15-19 Moderately Severe Depression   20-27 Severe Depression      Screening for Cognitive Impairment    Three Minute Recall (leader, season, table)  3/3    Draw clock face with all 12 numbers and set the hands to show  10 past 11.  Yes 1/5  If cognitive concerns identified, deferred for follow up unless specifically addressed in assessment and plan.    Fall Risk Assessment    Has the patient had two or more falls in the last year or any fall with injury in the last year?  No  If fall risk identified, deferred for follow up unless specifically addressed in assessment and plan.      Safety Assessment    Throw rugs on floor.  No  Handrails on all stairs.  No  Good lighting in all hallways.  Yes  Difficulty hearing.  No  Patient counseled about all safety risks that were identified.    Functional Assessment ADLs    Are there any barriers preventing you from cooking for yourself or meeting nutritional needs?  No.    Are there any barriers preventing you from driving safely or obtaining transportation?  No.    Are there any barriers preventing you from using a telephone or calling for help?  No.    Are there any barriers preventing you from shopping?  No.    Are there any barriers preventing you from taking care of your own finances?  No.    Are there any barriers preventing you from managing your medications?    No.    Are there any barriers preventing you from showering, bathing or dressing yourself?  No.    Are you currently engaging in any exercise or physical activity?  Yes.  Pt reports walking 1 hour daily  What is your perception of your health?  Poor.    Health Maintenance Summary                IMM DTaP/Tdap/Td Vaccine Overdue 6/29/1985     PAP SMEAR Overdue 1/29/2016      Done 1/29/2013 PAP IG, CT-NG TV RFX HPV ASCU     COLONOSCOPY Overdue 6/29/2016     IMM ZOSTER VACCINES Overdue 6/29/2016     MAMMOGRAM Overdue 5/17/2017      Done 5/17/2016 MA-SCREEN MAMMO W/CAD-BILAT     Patient has more history with this topic...    IMM PNEUMOCOCCAL 19-64 (ADULT) MEDIUM RISK SERIES Overdue 1/23/2018      Done 11/28/2017 Imm Admin: Pneumococcal Conjugate Vaccine (Prevnar/PCV-13)    Annual Wellness Visit Overdue 11/29/2018      Done  "11/28/2017      Patient has more history with this topic...          Patient Care Team:  DENNY Wills as PCP - General  Giovani Jones M.D. as Consulting Physician (Psychiatry)  Michelle Nuñez M.D. as Consulting Physician (Ophthalmology)      Social History   Substance Use Topics   • Smoking status: Current Every Day Smoker     Packs/day: 0.50     Years: 20.00     Types: Cigarettes   • Smokeless tobacco: Never Used      Comment: Since she was 17   • Alcohol use No     Family History   Problem Relation Age of Onset   • Cancer Maternal Grandfather    • Heart Disease Maternal Grandfather    • Cancer Paternal Grandmother    • Cancer Paternal Grandfather      She  has a past medical history of Bipolar 1 disorder, depressed (HCC); Hypothyroidism; Obesity (BMI 30-39.9); and Vitamin D deficiency. She also has no past medical history of Encounter for long-term (current) use of other medications.   Past Surgical History:   Procedure Laterality Date   • LUMPECTOMY     • TUBAL COAGULATION LAPAROSCOPIC BILATERAL           Exam:     Blood pressure 114/66, pulse 82, temperature 36.7 °C (98 °F), temperature source Temporal, height 1.634 m (5' 4.33\"), weight 99.8 kg (220 lb), SpO2 95 %, not currently breastfeeding. Body mass index is 37.38 kg/m².    Hearing excellent.    Dentition upper dentures  Alert, oriented in no acute distress.  Eye contact is good, speech goal directed, affect calm  Physical Exam   Vitals reviewed.  Constitutional: oriented to person, place, and time. appears well-developed and well-nourished. No distress.   HENT: Head: Normocephalic and atraumatic. Bilateral tympanic membranes wnl w/o bulging.  Right Ear: External ear normal. Left Ear: External ear normal. Nose: Nose normal.  Mouth/Throat: Oropharynx is clear and moist. No oropharyngeal exudate. val tm wnl. Eyes: Conjunctivae and EOM are normal. Pupils are equal, round, and reactive to light. Right eye exhibits no discharge. Left eye exhibits no " discharge. No scleral icterus.    Neck: Normal range of motion. Neck supple. No JVD present.   Cardiovascular: Normal rate, regular rhythm, normal heart sounds and intact distal pulses.  Exam reveals no gallop and no friction rub.  No murmur heard.  No carotid bruits   Pulmonary/Chest: Effort normal and breath sounds normal. No stridor. No respiratory distress. no wheezes or rales. exhibits no tenderness.   Abdominal: Soft. Bowel sounds are normal. exhibits no distension and no mass. No tenderness. no rebound and no guarding.   Musculoskeletal: Normal range of motion. exhibits no edema or tenderness.  val pedal pulses 2+.  Lymphadenopathy:  no cervical or supraclavicular adenopathy.   Neurological: alert and oriented to person, place, and time. has normal reflexes. displays normal reflexes. No cranial nerve deficit. exhibits normal muscle tone. Coordination normal.   PERRLA.  She has ptosis rt eye.  Also has rt facial drooping.    Skin: Skin is warm and dry. No rash noted. no diaphoresis. No erythema. No pallor.   Psychiatric: normal mood and affect. behavior is normal.         Assessment and Plan. The following treatment and monitoring plan is recommended:    1. Acquired ptosis of right eyelid  REFERRAL TO NEUROLOGY    MR-BRAIN-W/O    US-CAROTID DOPPLER BILAT    EC-ECHOCARDIOGRAM COMPLETE W/O CONT    WESTERGREN SED RATE    new onset for 6 months rt ptosis and facial droop.  do MRI brain, echo, carotid and refer to neuro.  do ESR also   2. Facial droop  REFERRAL TO NEUROLOGY    MR-BRAIN-W/O    US-CAROTID DOPPLER BILAT    EC-ECHOCARDIOGRAM COMPLETE W/O CONT    WESTERGREN SED RATE   3. Vitamin D deficiency  Subsequent Annual Wellness Visit - Includes PPPS ()    VITAMIN D,25 HYDROXY    she is on 5000 units daily otc.  will recheck lab.  do lab and f/u 1 month for lab review and pap smear   4. Acquired hypothyroidism  Subsequent Annual Wellness Visit - Includes PPPS ()    levothyroxine (LEVOXYL) 100 MCG Tab     TSH    FREE THYROXINE    stable on meds but needs updated lab.  do lab and f/u    5. Bipolar disease, chronic (HCC)  Subsequent Annual Wellness Visit - Includes PPPS ()    paliperidone (INVEGA) 6 MG ER tablet    LITHIUM    CBC WITH DIFFERENTIAL    COMP METABOLIC PANEL    stable on meds.  followed by psych   6. Obesity (BMI 35.0-39.9 without comorbidity)  Patient identified as having weight management issue.  Appropriate orders and counseling given.    Subsequent Annual Wellness Visit - Includes PPPS ()    Lipid Profile    enc improved healthy diet and exercise.  still smoking but trying to dec amt.    7. Screening for colon cancer  OCCULT BLOOD FECES IMMUNOASSAY    Subsequent Annual Wellness Visit - Includes PPPS ()   8. Need for vaccination  Subsequent Annual Wellness Visit - Includes PPPS ()    Pneumococal Polysaccharide Vaccine 23-Valent =>3YO SQ/IM    CANCELED: Pneumococal Polysaccharide Vaccine 23-Valent =>1yo SQ/IM   9. Encounter for screening mammogram for breast cancer  MA-SCREEN MAMMO W/CAD-BILAT    Subsequent Annual Wellness Visit - Includes PPPS ()   10. High risk medication use  Subsequent Annual Wellness Visit - Includes PPPS ()    LITHIUM    CBC WITH DIFFERENTIAL    COMP METABOLIC PANEL   11. Encounter for screening mammogram for malignant neoplasm of breast  Subsequent Annual Wellness Visit - Includes PPPS ()   12. Other symptoms and signs involving the nervous system  REFERRAL TO NEUROLOGY    MR-BRAIN-W/O    US-CAROTID DOPPLER BILAT    EC-ECHOCARDIOGRAM COMPLETE W/O CONT         Services suggested: No services needed at this time  Health Care Screening recommendations as per orders if indicated.  Referrals offered: PT/OT/Nutrition counseling/Behavioral Health/Smoking cessation as per orders if indicated.    Discussion today about general wellness and lifestyle habits:    · Prevent falls and reduce trip hazards; Cautioned about securing or removing rugs.  · Have a  working fire alarm and carbon monoxide detector;   · Engage in regular physical activity and social activities       Follow-up: 1 month for lab review, pap smear and test review

## 2019-02-07 ENCOUNTER — HOSPITAL ENCOUNTER (OUTPATIENT)
Dept: RADIOLOGY | Facility: MEDICAL CENTER | Age: 54
End: 2019-02-07
Attending: NURSE PRACTITIONER
Payer: MEDICARE

## 2019-02-07 ENCOUNTER — HOSPITAL ENCOUNTER (OUTPATIENT)
Dept: LAB | Facility: MEDICAL CENTER | Age: 54
End: 2019-02-07
Attending: NURSE PRACTITIONER
Payer: MEDICARE

## 2019-02-07 DIAGNOSIS — E03.9 ACQUIRED HYPOTHYROIDISM: ICD-10-CM

## 2019-02-07 DIAGNOSIS — H02.401 ACQUIRED PTOSIS OF RIGHT EYELID: ICD-10-CM

## 2019-02-07 DIAGNOSIS — E66.9 OBESITY (BMI 35.0-39.9 WITHOUT COMORBIDITY): ICD-10-CM

## 2019-02-07 DIAGNOSIS — R29.818 OTHER SYMPTOMS AND SIGNS INVOLVING THE NERVOUS SYSTEM: ICD-10-CM

## 2019-02-07 DIAGNOSIS — R29.810 FACIAL DROOP: ICD-10-CM

## 2019-02-07 DIAGNOSIS — E55.9 VITAMIN D DEFICIENCY: ICD-10-CM

## 2019-02-07 DIAGNOSIS — F31.9 BIPOLAR DISEASE, CHRONIC (HCC): ICD-10-CM

## 2019-02-07 DIAGNOSIS — Z79.899 HIGH RISK MEDICATION USE: ICD-10-CM

## 2019-02-07 LAB
25(OH)D3 SERPL-MCNC: 38 NG/ML (ref 30–100)
ALBUMIN SERPL BCP-MCNC: 4.3 G/DL (ref 3.2–4.9)
ALBUMIN/GLOB SERPL: 1.5 G/DL
ALP SERPL-CCNC: 80 U/L (ref 30–99)
ALT SERPL-CCNC: 15 U/L (ref 2–50)
ANION GAP SERPL CALC-SCNC: 4 MMOL/L (ref 0–11.9)
AST SERPL-CCNC: 13 U/L (ref 12–45)
BASOPHILS # BLD AUTO: 0.7 % (ref 0–1.8)
BASOPHILS # BLD: 0.06 K/UL (ref 0–0.12)
BILIRUB SERPL-MCNC: 0.3 MG/DL (ref 0.1–1.5)
BUN SERPL-MCNC: 19 MG/DL (ref 8–22)
CALCIUM SERPL-MCNC: 10 MG/DL (ref 8.5–10.5)
CHLORIDE SERPL-SCNC: 108 MMOL/L (ref 96–112)
CHOLEST SERPL-MCNC: 160 MG/DL (ref 100–199)
CO2 SERPL-SCNC: 26 MMOL/L (ref 20–33)
CREAT SERPL-MCNC: 0.88 MG/DL (ref 0.5–1.4)
EOSINOPHIL # BLD AUTO: 0.21 K/UL (ref 0–0.51)
EOSINOPHIL NFR BLD: 2.6 % (ref 0–6.9)
ERYTHROCYTE [DISTWIDTH] IN BLOOD BY AUTOMATED COUNT: 48.3 FL (ref 35.9–50)
ERYTHROCYTE [SEDIMENTATION RATE] IN BLOOD BY WESTERGREN METHOD: 14 MM/HOUR (ref 0–30)
FASTING STATUS PATIENT QL REPORTED: NORMAL
GLOBULIN SER CALC-MCNC: 2.8 G/DL (ref 1.9–3.5)
GLUCOSE SERPL-MCNC: 110 MG/DL (ref 65–99)
HCT VFR BLD AUTO: 42.1 % (ref 37–47)
HDLC SERPL-MCNC: 38 MG/DL
HGB BLD-MCNC: 13.4 G/DL (ref 12–16)
IMM GRANULOCYTES # BLD AUTO: 0.03 K/UL (ref 0–0.11)
IMM GRANULOCYTES NFR BLD AUTO: 0.4 % (ref 0–0.9)
LDLC SERPL CALC-MCNC: 78 MG/DL
LITHIUM SERPL-MCNC: 0.9 MMOL/L (ref 0.6–1.2)
LYMPHOCYTES # BLD AUTO: 2.23 K/UL (ref 1–4.8)
LYMPHOCYTES NFR BLD: 27.4 % (ref 22–41)
MCH RBC QN AUTO: 30 PG (ref 27–33)
MCHC RBC AUTO-ENTMCNC: 31.8 G/DL (ref 33.6–35)
MCV RBC AUTO: 94.4 FL (ref 81.4–97.8)
MONOCYTES # BLD AUTO: 0.46 K/UL (ref 0–0.85)
MONOCYTES NFR BLD AUTO: 5.6 % (ref 0–13.4)
NEUTROPHILS # BLD AUTO: 5.16 K/UL (ref 2–7.15)
NEUTROPHILS NFR BLD: 63.3 % (ref 44–72)
NRBC # BLD AUTO: 0 K/UL
NRBC BLD-RTO: 0 /100 WBC
PLATELET # BLD AUTO: 256 K/UL (ref 164–446)
PMV BLD AUTO: 10.5 FL (ref 9–12.9)
POTASSIUM SERPL-SCNC: 4.2 MMOL/L (ref 3.6–5.5)
PROT SERPL-MCNC: 7.1 G/DL (ref 6–8.2)
RBC # BLD AUTO: 4.46 M/UL (ref 4.2–5.4)
SODIUM SERPL-SCNC: 138 MMOL/L (ref 135–145)
T4 FREE SERPL-MCNC: 0.75 NG/DL (ref 0.53–1.43)
TRIGL SERPL-MCNC: 219 MG/DL (ref 0–149)
TSH SERPL DL<=0.005 MIU/L-ACNC: 1.55 UIU/ML (ref 0.38–5.33)
WBC # BLD AUTO: 8.2 K/UL (ref 4.8–10.8)

## 2019-02-07 PROCEDURE — 85652 RBC SED RATE AUTOMATED: CPT

## 2019-02-07 PROCEDURE — 84439 ASSAY OF FREE THYROXINE: CPT

## 2019-02-07 PROCEDURE — 80053 COMPREHEN METABOLIC PANEL: CPT

## 2019-02-07 PROCEDURE — 82306 VITAMIN D 25 HYDROXY: CPT

## 2019-02-07 PROCEDURE — 84443 ASSAY THYROID STIM HORMONE: CPT

## 2019-02-07 PROCEDURE — 80061 LIPID PANEL: CPT

## 2019-02-07 PROCEDURE — 36415 COLL VENOUS BLD VENIPUNCTURE: CPT

## 2019-02-07 PROCEDURE — 80178 ASSAY OF LITHIUM: CPT

## 2019-02-07 PROCEDURE — 70551 MRI BRAIN STEM W/O DYE: CPT

## 2019-02-07 PROCEDURE — 85025 COMPLETE CBC W/AUTO DIFF WBC: CPT

## 2019-02-08 ENCOUNTER — TELEPHONE (OUTPATIENT)
Dept: MEDICAL GROUP | Facility: MEDICAL CENTER | Age: 54
End: 2019-02-08

## 2019-02-08 NOTE — TELEPHONE ENCOUNTER
----- Message from Marcio Bonner M.D. sent at 2/8/2019  2:43 PM PST -----  Monica's patient.  Please notify patient that her brain MRI is normal.  If she continues to have problems, please have her follow up for an appointment.  Marcio Bonner M.D.

## 2019-02-11 ENCOUNTER — TELEPHONE (OUTPATIENT)
Dept: URGENT CARE | Facility: MEDICAL CENTER | Age: 54
End: 2019-02-11

## 2019-02-11 NOTE — LETTER
February 11, 2019        Ginette Vasquez  [address] [place] [address]        Dear Ginette:    After careful review of your chart, we have noted you are due for a follow up appointment.  We request you call our office at [phone] at your earliest convenience and make an appointment.     We look forward to scheduling an appointment for you, so that we may provide you with the safest and most complete medical care.        If you have any questions or concerns, please don't hesitate to call.        Sincerely,        DANETTE Wills.    Electronically Signed

## 2019-02-11 NOTE — TELEPHONE ENCOUNTER
----- Message from DENNY Wills sent at 2/11/2019  6:21 AM PST -----  Please have pt set appointment to review and discuss treatment for labs.

## 2019-02-19 ENCOUNTER — TELEPHONE (OUTPATIENT)
Dept: MEDICAL GROUP | Facility: MEDICAL CENTER | Age: 54
End: 2019-02-19

## 2019-02-21 ENCOUNTER — TELEPHONE (OUTPATIENT)
Dept: MEDICAL GROUP | Facility: MEDICAL CENTER | Age: 54
End: 2019-02-21

## 2019-02-21 ENCOUNTER — HOSPITAL ENCOUNTER (OUTPATIENT)
Dept: CARDIOLOGY | Facility: MEDICAL CENTER | Age: 54
End: 2019-02-21
Attending: NURSE PRACTITIONER
Payer: MEDICARE

## 2019-02-21 ENCOUNTER — HOSPITAL ENCOUNTER (OUTPATIENT)
Dept: RADIOLOGY | Facility: MEDICAL CENTER | Age: 54
End: 2019-02-21
Attending: NURSE PRACTITIONER
Payer: MEDICARE

## 2019-02-21 DIAGNOSIS — H02.401 ACQUIRED PTOSIS OF RIGHT EYELID: ICD-10-CM

## 2019-02-21 DIAGNOSIS — R29.818 OTHER SYMPTOMS AND SIGNS INVOLVING THE NERVOUS SYSTEM: ICD-10-CM

## 2019-02-21 DIAGNOSIS — R29.810 FACIAL DROOP: ICD-10-CM

## 2019-02-21 PROCEDURE — 93880 EXTRACRANIAL BILAT STUDY: CPT

## 2019-02-21 PROCEDURE — 93306 TTE W/DOPPLER COMPLETE: CPT

## 2019-02-21 PROCEDURE — 93306 TTE W/DOPPLER COMPLETE: CPT | Mod: 26 | Performed by: INTERNAL MEDICINE

## 2019-02-22 LAB
LV EJECT FRACT  99904: 65
LV EJECT FRACT MOD 2C 99903: 76.48
LV EJECT FRACT MOD 4C 99902: 58.02
LV EJECT FRACT MOD BP 99901: 69.24

## 2019-02-24 ENCOUNTER — TELEPHONE (OUTPATIENT)
Dept: MEDICAL GROUP | Facility: MEDICAL CENTER | Age: 54
End: 2019-02-24

## 2019-03-21 ENCOUNTER — HOSPITAL ENCOUNTER (OUTPATIENT)
Facility: MEDICAL CENTER | Age: 54
End: 2019-03-21
Attending: NURSE PRACTITIONER
Payer: MEDICARE

## 2019-03-21 ENCOUNTER — OFFICE VISIT (OUTPATIENT)
Dept: MEDICAL GROUP | Facility: MEDICAL CENTER | Age: 54
End: 2019-03-21
Payer: MEDICARE

## 2019-03-21 VITALS
HEIGHT: 64 IN | SYSTOLIC BLOOD PRESSURE: 106 MMHG | DIASTOLIC BLOOD PRESSURE: 64 MMHG | BODY MASS INDEX: 38.76 KG/M2 | HEART RATE: 94 BPM | OXYGEN SATURATION: 94 % | WEIGHT: 227 LBS | TEMPERATURE: 97.9 F

## 2019-03-21 DIAGNOSIS — Z01.419 PAP SMEAR, LOW-RISK: ICD-10-CM

## 2019-03-21 DIAGNOSIS — Z12.4 ROUTINE CERVICAL SMEAR: ICD-10-CM

## 2019-03-21 DIAGNOSIS — E78.5 HYPERLIPIDEMIA LDL GOAL <100: ICD-10-CM

## 2019-03-21 DIAGNOSIS — R73.9 HYPERGLYCEMIA: ICD-10-CM

## 2019-03-21 LAB
HBA1C MFR BLD: 6.8 % (ref ?–5.8)
INT CON NEG: NORMAL
INT CON POS: NORMAL

## 2019-03-21 PROCEDURE — 87624 HPV HI-RISK TYP POOLED RSLT: CPT

## 2019-03-21 PROCEDURE — 83036 HEMOGLOBIN GLYCOSYLATED A1C: CPT | Performed by: NURSE PRACTITIONER

## 2019-03-21 PROCEDURE — 88175 CYTOPATH C/V AUTO FLUID REDO: CPT

## 2019-03-21 PROCEDURE — 99213 OFFICE O/P EST LOW 20 MIN: CPT | Mod: 25 | Performed by: NURSE PRACTITIONER

## 2019-03-21 NOTE — PROGRESS NOTES
Subjective:      Ginette Vasquez is a 53 y.o. female who presents with pap smear          HPI  Seen in f/u for pap smear.  She is feeling well.  She is seen with her mother today.  She is her primary caregiver.    Reviewed lab with pt and mom. GFR, ESR, D, T4, TSH, CBC, lithium level is wnl.  Echo, MRI brain, carotid us are wnl.    LP shows elevated trg at 219.  Not on low carb diet.  a1c in office is 5.6.  HDL is low at 39.  Was 44 last time.  Not exercising.  LDL is at goal at 78.  Goal is <100.  CMP is wnl except glucose 110.  Was 100 last check.     Patient Active Problem List    Diagnosis Date Noted   • Obesity (BMI 35.0-39.9 without comorbidity) ([facility]) 01/24/2019   • Vitamin D deficiency    • Hypothyroidism 10/12/2011   • Bipolar disease, chronic ([facility]) 10/12/2011   • Preventative health care 10/12/2011     Current Outpatient Prescriptions   Medication Sig Dispense Refill   • paliperidone (INVEGA) 6 MG ER tablet Take 1 Tab by mouth every morning. 30 Tab 0   • levothyroxine (LEVOXYL) 100 MCG Tab Take 1 Tab by mouth every day. 30 Tab 0   • lithium CR (ESKALITH CR) 450 MG Tab CR Take 450 mg by mouth 2 times a day.     • ascorbic acid (VITAMIN C) 500 MG tablet Take 1 Tab by mouth every day. 30 Tab 0   • sennosides-docusate sodium (SENOKOT-S) 8.6-50 MG tablet Take 1 Tab by mouth 2 times a day. 180 Tab 1   • quetiapine (SEROQUEL) 400 MG tablet Take 400 mg by mouth 2 times a day.     • Cholecalciferol (VITAMIN D) 2000 UNITS CAPS Take 2,000 Units by mouth every day.     • lithium (ESKALITH) 300 MG TABS Take 300 mg by mouth Every morning on an empty stomach.       No current facility-administered medications for this visit.      Allergies   Allergen Reactions   • Nkda [No Known Drug Allergy]        ROS  Review of Systems   Constitutional: Negative.  Negative for fever, chills, weight loss, malaise/fatigue and diaphoresis.   HENT: Negative.  Negative for hearing loss, ear pain, nosebleeds, congestion, sore  "throat, neck pain, tinnitus and ear discharge.    Eyes: Negative.  Negative for blurred vision, double vision, photophobia, pain, discharge and redness.   Respiratory: Negative.  Negative for cough, hemoptysis, sputum production, shortness of breath, wheezing and stridor.    Cardiovascular: Negative.  Negative for chest pain, palpitations, orthopnea, claudication, leg swelling and PND.   Gastrointestinal: Negative.  Negative for heartburn, nausea, vomiting, abdominal pain, diarrhea, constipation, blood in stool and melena.   Genitourinary: Negative.  Negative for dysuria, urgency, frequency, incontinence, hematuria and flank pain.   Musculoskeletal: Negative.  Negative for myalgias, back pain, joint pain and falls.   Skin: Negative.  Negative for itching and rash.   Neurological: Negative.  Negative for dizziness, tingling, tremors, sensory change, speech change, focal weakness, seizures, loss of consciousness, weakness and headaches.   Endo/Heme/Allergies: Negative.  Negative for environmental allergies and polydipsia. Does not bruise/bleed easily.   Psychiatric/Behavioral: Negative.  Negative for depression, suicidal ideas, hallucinations, memory loss and substance abuse. The patient is not nervous/anxious and does not have insomnia.    All other systems reviewed and are negative.           Objective:     /64 (BP Location: Right arm, Patient Position: Sitting)   Pulse 94   Temp 36.6 °C (97.9 °F) (Temporal)   Ht 1.626 m (5' 4\")   Wt 103 kg (227 lb)   SpO2 94%   BMI 38.96 kg/m²      Physical Exam  Physical Exam   Vitals reviewed.  Constitutional: oriented to person, place, and time. appears well-developed and well-nourished. No distress.   HENT: Head: Normocephalic and atraumatic. Bilateral tympanic membranes wnl w/o bulging.  Right Ear: External ear normal. Left Ear: External ear normal. Nose: Nose normal.  Mouth/Throat: Oropharynx is clear and moist. No oropharyngeal exudate. val tm wnl. Eyes: " Conjunctivae and EOM are normal. Pupils are equal, round, and reactive to light. Right eye exhibits no discharge. Left eye exhibits no discharge. No scleral icterus.    Neck: Normal range of motion. Neck supple. No JVD present.   Cardiovascular: Normal rate, regular rhythm, normal heart sounds and intact distal pulses.  Exam reveals no gallop and no friction rub.  No murmur heard.  No carotid bruits   Pulmonary/Chest: Effort normal and breath sounds normal. No stridor. No respiratory distress. no wheezes or rales. exhibits no tenderness.   Abdominal: Soft. Bowel sounds are normal. exhibits no distension and no mass. No tenderness. no rebound and no guarding.   Musculoskeletal: Normal range of motion. exhibits no edema or tenderness.  val pedal pulses 2+.  Lymphadenopathy:  no cervical or supraclavicular adenopathy.   Neurological: alert and oriented to person, place, and time. has normal reflexes. displays normal reflexes. No cranial nerve deficit. exhibits normal muscle tone. Coordination normal.   Skin: Skin is warm and dry. No rash noted. no diaphoresis. No erythema. No pallor.   Psychiatric: normal mood and affect. behavior is normal.   SUBJECTIVE: 53 y.o. female for annual routine pap and checkup.  Gyn History:   Last Pap:   Contraception: none  H/O Abnormal Pap no  H/O STI none  Current partner: none  Lmp:  1 yr ago  ROS:  No pelvic pain, vaginal discharge or dyspareunia.  No breast tenderness, mass, nipple discharge, changes in size or contour, or abnormal cyclic discomfort.   Breast Exam:Performed with instruction during examination. Breasts equal size and shape.  No axillary lymphadenopathy, no skin changes, no dominant masses. No nipple retraction  Pelvic Exam - Sure Path Pap obtained and specimen sent to lab. Normal external genitalia with no lesions. Normal vaginal mucosa with normal rugation. Cervix has no visible lesions. No cervical motion tenderness. Uterus is normal sized with no masses. No  adnexal tenderness or enlargement appreciated.  Rectal: sphincter tone normal, no mass, stool guaiac negative         Assessment/Plan:     1. Pap smear, low-risk  THINPREP PAP WITH HPV    f/u with pt with pap smear results.  then f/u yearly.  call for lab slip   2. Routine cervical smear  THINPREP PAP WITH HPV   3. Hyperglycemia  POCT Hemoglobin A1C    a1c in office is 5.6.  limit carbs in diet.  plan recehck lab 1 yr.  call for lab slip   4. Hyperlipidemia LDL goal <100  POCT Hemoglobin A1C    improve low carb diet and inc exercise d/t elevated trg and low HDL.  LDL is at goal

## 2019-03-22 LAB
CYTOLOGY REG CYTOL: NORMAL
HPV HR 12 DNA CVX QL NAA+PROBE: NEGATIVE
HPV16 DNA SPEC QL NAA+PROBE: NEGATIVE
HPV18 DNA SPEC QL NAA+PROBE: NEGATIVE
SPECIMEN SOURCE: NORMAL

## 2019-04-18 ENCOUNTER — APPOINTMENT (OUTPATIENT)
Dept: RADIOLOGY | Facility: MEDICAL CENTER | Age: 54
End: 2019-04-18
Attending: NURSE PRACTITIONER
Payer: MEDICARE

## 2019-10-28 ENCOUNTER — APPOINTMENT (OUTPATIENT)
Dept: RADIOLOGY | Facility: MEDICAL CENTER | Age: 54
End: 2019-10-28
Attending: NURSE PRACTITIONER
Payer: MEDICARE

## 2021-12-21 ENCOUNTER — HOSPITAL ENCOUNTER (OUTPATIENT)
Dept: RADIOLOGY | Facility: MEDICAL CENTER | Age: 56
End: 2021-12-21
Attending: PHYSICIAN ASSISTANT
Payer: MEDICARE

## 2021-12-21 DIAGNOSIS — M17.12 UNILATERAL PRIMARY OSTEOARTHRITIS, LEFT KNEE: ICD-10-CM

## 2021-12-21 PROCEDURE — 73721 MRI JNT OF LWR EXTRE W/O DYE: CPT | Mod: LT,MH
